# Patient Record
Sex: MALE | Race: WHITE | NOT HISPANIC OR LATINO | Employment: STUDENT | URBAN - METROPOLITAN AREA
[De-identification: names, ages, dates, MRNs, and addresses within clinical notes are randomized per-mention and may not be internally consistent; named-entity substitution may affect disease eponyms.]

---
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
## 2024-02-23 PROBLEM — K92.1 GASTROINTESTINAL HEMORRHAGE WITH MELENA: Status: ACTIVE | Noted: 2024-02-23

## 2024-02-23 PROBLEM — K56.1 INTUSSUSCEPTION OF INTESTINE: Status: ACTIVE | Noted: 2024-02-23

## 2024-02-23 PROBLEM — R93.3 ABNORMAL COMPUTED TOMOGRAPHY OF SIGMOID COLON: Status: ACTIVE | Noted: 2024-02-23

## 2024-03-13 PROBLEM — K92.2 ACUTE LOWER GI BLEEDING: Status: ACTIVE | Noted: 2024-03-13

## 2024-03-13 PROBLEM — K92.1 HEMATOCHEZIA: Status: ACTIVE | Noted: 2024-03-13
